# Patient Record
Sex: MALE | Race: WHITE
[De-identification: names, ages, dates, MRNs, and addresses within clinical notes are randomized per-mention and may not be internally consistent; named-entity substitution may affect disease eponyms.]

---

## 2018-04-28 ENCOUNTER — HOSPITAL ENCOUNTER (EMERGENCY)
Dept: HOSPITAL 12 - ER | Age: 48
Discharge: HOME | End: 2018-04-28
Payer: SELF-PAY

## 2018-04-28 VITALS — HEIGHT: 69 IN | BODY MASS INDEX: 27.4 KG/M2 | WEIGHT: 185 LBS

## 2018-04-28 DIAGNOSIS — H20.9: Primary | ICD-10-CM

## 2018-04-28 DIAGNOSIS — Z90.89: ICD-10-CM

## 2018-04-28 PROCEDURE — A4663 DIALYSIS BLOOD PRESSURE CUFF: HCPCS

## 2018-08-09 ENCOUNTER — HOSPITAL ENCOUNTER (EMERGENCY)
Dept: HOSPITAL 12 - ER | Age: 48
Discharge: ANOTHER HOSPITAL | End: 2018-08-09
Payer: MEDICAID

## 2018-08-09 VITALS — HEIGHT: 69 IN | BODY MASS INDEX: 27.4 KG/M2 | WEIGHT: 185 LBS
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
## 2018-08-09 DIAGNOSIS — Z90.89: ICD-10-CM
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
## 2018-08-09 DIAGNOSIS — S31.031A: Primary | ICD-10-CM

## 2018-08-09 DIAGNOSIS — F15.10: ICD-10-CM
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
## 2018-08-09 DIAGNOSIS — Z59.0: ICD-10-CM
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
## 2018-08-09 LAB
ALP SERPL-CCNC: 89 U/L (ref 50–136)
ALT SERPL W/O P-5'-P-CCNC: 24 U/L (ref 16–63)
AST SERPL-CCNC: 26 U/L (ref 15–37)
BASOPHILS # BLD AUTO: 0.1 K/UL (ref 0–8)
BASOPHILS NFR BLD AUTO: 0.7 % (ref 0–2)
BILIRUB DIRECT SERPL-MCNC: 0.2 MG/DL (ref 0–0.2)
BILIRUB SERPL-MCNC: 0.8 MG/DL (ref 0.2–1)
BUN SERPL-MCNC: 7 MG/DL (ref 7–18)
CHLORIDE SERPL-SCNC: 103 MMOL/L (ref 98–107)
CO2 SERPL-SCNC: 30 MMOL/L (ref 21–32)
CREAT SERPL-MCNC: 0.9 MG/DL (ref 0.6–1.3)
EOSINOPHIL # BLD AUTO: 0.1 K/UL (ref 0–0.7)
EOSINOPHIL NFR BLD AUTO: 1.9 % (ref 0–7)
GLUCOSE SERPL-MCNC: 93 MG/DL (ref 74–106)
HCT VFR BLD AUTO: 37.3 % (ref 36.7–47.1)
HGB BLD-MCNC: 12.6 G/DL (ref 12.5–16.3)
LIPASE SERPL-CCNC: 141 U/L (ref 73–393)
LYMPHOCYTES # BLD AUTO: 2.5 K/UL (ref 20–40)
LYMPHOCYTES NFR BLD AUTO: 33.2 % (ref 20.5–51.5)
MCH RBC QN AUTO: 29.7 UUG (ref 23.8–33.4)
MCHC RBC AUTO-ENTMCNC: 34 G/DL (ref 32.5–36.3)
MCV RBC AUTO: 87.8 FL (ref 73–96.2)
MONOCYTES # BLD AUTO: 0.5 K/UL (ref 2–10)
MONOCYTES NFR BLD AUTO: 6.8 % (ref 0–11)
NEUTROPHILS # BLD AUTO: 4.3 K/UL (ref 1.8–8.9)
NEUTROPHILS NFR BLD AUTO: 57.4 % (ref 38.5–71.5)
PLATELET # BLD AUTO: 238 K/UL (ref 152–348)
POTASSIUM SERPL-SCNC: 4.2 MMOL/L (ref 3.5–5.1)
RBC # BLD AUTO: 4.24 MIL/UL (ref 4.06–5.63)
WBC # BLD AUTO: 7.5 K/UL (ref 3.6–10.2)
WS STN SPEC: 6.6 G/DL (ref 6.4–8.2)

## 2018-08-09 PROCEDURE — A4217 STERILE WATER/SALINE, 500 ML: HCPCS

## 2018-08-09 PROCEDURE — A4663 DIALYSIS BLOOD PRESSURE CUFF: HCPCS

## 2018-08-09 SDOH — ECONOMIC STABILITY - HOUSING INSECURITY: HOMELESSNESS: Z59.0

## 2018-08-09 NOTE — NUR
[facility] arrived for patient . Report /Chart given to Paramedics. 
Patient picked up in stable condition. Patient transferred to [facility].
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
